# Patient Record
Sex: FEMALE | Race: BLACK OR AFRICAN AMERICAN | Employment: FULL TIME | URBAN - METROPOLITAN AREA
[De-identification: names, ages, dates, MRNs, and addresses within clinical notes are randomized per-mention and may not be internally consistent; named-entity substitution may affect disease eponyms.]

---

## 2021-04-20 ENCOUNTER — APPOINTMENT (OUTPATIENT)
Dept: LAB | Facility: CLINIC | Age: 56
End: 2021-04-20

## 2021-04-26 ENCOUNTER — VIRTUAL VISIT (OUTPATIENT)
Dept: FAMILY MEDICINE | Facility: CLINIC | Age: 56
End: 2021-04-26

## 2021-04-26 VITALS — HEIGHT: 66 IN | WEIGHT: 230 LBS | BODY MASS INDEX: 36.96 KG/M2

## 2021-04-26 DIAGNOSIS — Z11.1 SCREENING EXAMINATION FOR PULMONARY TUBERCULOSIS: Primary | ICD-10-CM

## 2021-04-26 PROCEDURE — 99202 OFFICE O/P NEW SF 15 MIN: CPT | Mod: 95 | Performed by: INTERNAL MEDICINE

## 2021-04-26 ASSESSMENT — MIFFLIN-ST. JEOR: SCORE: 1660.02

## 2021-04-26 NOTE — PATIENT INSTRUCTIONS
Patient Education     Tuberculosis Testing    Tuberculosis (TB) is a bacterial disease that spreads through the air. A person with TB of the throat or lungs who coughs, speaks, or sings can unknowingly spread the bacteria into the air. Uninfected people nearby can breathe in the TB bacteria and be infected.  TB can cause serious health problems. To protect your health, get tested.  Important  If you have active TB disease or simply test positive for TB infection, you must see a healthcare provider for an exam and treatment.   Who should be tested?  Anyone can be exposed to TB. But healthcare providers, homeless people, and people coming from countries with high TB rates are more likely to be exposed to it. Older adults and people with HIV and AIDS are less able to fight off infections. They are also more likely to get TB. If you re at risk for exposure, get tested regularly.  TB tests    Skin test. The TB skin test tells you if the TB bacteria are in your body. A small amount of solution is put under the skin with a needle. This is done to see if a reaction occurs, such as a hard, red bump.    Blood test. A small amount of blood is drawn and sent to a lab for testing.    Other tests. If you are being evaluated for active TB, a sample may be taken of the mucus that comes up when you cough (sputum). Or some other tissue or body fluid sample may be taken. It is then tested for TB.  Getting your TB test results  After the placement of a TB skin test, 2 to 3 days later you ll need to return to the office to get the test read. Be sure to keep this appointment. In some cases, a second test may be done to confirm results. If you have a blood test, the results are often available in a week. Samples of sputum and other body tissues and fluids can take up to 6 weeks for final results.  What do the test results mean    Negative results. These mean you likely don t have the TB bacteria in your body. But in people with some  chronic illnesses, the TB tests can be negative even if there is TB in the body.    Positive results. These mean that you may have been infected with the TB bacteria. This doesn t necessarily mean you have active TB disease. You will need more tests, such as chest X-rays, to find out if you have active TB disease. If you don't have active TB, then you are said to have latent TB infection.   [employer] last reviewed this educational content on 6/1/2019 2000-2021 [employer]. All rights reserved. This information is not intended as a substitute for professional medical care. Always follow your healthcare professional's instructions.

## 2021-04-26 NOTE — PROGRESS NOTES
"Ani is a 55 year old who is being evaluated via a billable telephone visit.      What phone number would you like to be contacted at? [phone]  How would you like to obtain your AVS? Mail a copy    Assessment & Plan     Screening examination for pulmonary tuberculosis  Patient is completely symptomatic however she requests a TB test for an appointment  She had a skin test in the past which was negative  She is completely asymptomatic  No history of any cough or shortness of breath or night sweats  No history of her loss of weight  I gave her the address of [facility] to her  I also provided her with the central scheduling number as well as [facility] number  Advised her to call and make an appointment with the lab  - [facility] TB Gold Plus; Future      15 minutes spent on the date of the encounter doing chart review, history and exam, documentation and further activities per the note       BMI:   Estimated body mass index is 37.12 kg/m  as calculated from the following:    Height as of this encounter: 1.676 m (5' 6\").    Weight as of this encounter: 104.3 kg (230 lb).           Return if symptoms worsen or fail to improve.    Diego Paige MD  [facility] [place]    Rocio Law is a 55 year old who presents for the following health issues     HPI     Pt would like TB for work.        Review of Systems   Constitutional, HEENT, cardiovascular, pulmonary, gi and gu systems are negative, except as otherwise noted.      Objective           Vitals:  No vitals were obtained today due to virtual visit.    Physical Exam   healthy, alert and no distress  PSYCH: Alert and oriented times 3; coherent speech, normal   rate and volume, able to articulate logical thoughts, able   to abstract reason, no tangential thoughts, no hallucinations   or delusions  Her affect is normal  RESP: No cough, no audible wheezing, able to talk in full sentences  Remainder of exam unable to be " completed due to telephone visits                Phone call duration: 10 minutes

## 2021-04-27 ENCOUNTER — TELEPHONE (OUTPATIENT)
Dept: FAMILY MEDICINE | Facility: CLINIC | Age: 56
End: 2021-04-27

## 2021-04-27 DIAGNOSIS — Z11.1 SCREENING EXAMINATION FOR PULMONARY TUBERCULOSIS: ICD-10-CM

## 2021-04-27 NOTE — LETTER
[address] [address]  Phone: [phone]    May 3, 2021        Ani Alves  [id number] [address]    [place] [address]          To whom it may concern:    RE: Ani Alves    Your QuantiFERON-TB test is negative  This was done on 4/30/2021    Please contact me for questions or concerns.      Sincerely,        Diego Paige MD

## 2021-04-27 NOTE — TELEPHONE ENCOUNTER
Pt had virtual visit yesterday, and lab appt today, /April 27, 2021.    Employment screening form placed in Dr. Diego Paige's bin for review      Nirmala AGUIRRE (R))

## 2021-04-30 ENCOUNTER — MEDICAL CORRESPONDENCE (OUTPATIENT)
Dept: HEALTH INFORMATION MANAGEMENT | Facility: CLINIC | Age: 56
End: 2021-04-30

## 2021-04-30 ENCOUNTER — TRANSFERRED RECORDS (OUTPATIENT)
Dept: HEALTH INFORMATION MANAGEMENT | Facility: CLINIC | Age: 56
End: 2021-04-30

## 2021-04-30 DIAGNOSIS — Z11.1 SCREENING EXAMINATION FOR PULMONARY TUBERCULOSIS: ICD-10-CM

## 2021-04-30 PROCEDURE — 36415 COLL VENOUS BLD VENIPUNCTURE: CPT | Performed by: INTERNAL MEDICINE

## 2021-04-30 PROCEDURE — 86481 TB AG RESPONSE T-CELL SUSP: CPT | Performed by: INTERNAL MEDICINE

## 2021-05-02 LAB
GAMMA INTERFERON BACKGROUND BLD IA-ACNC: 0.05 IU/ML
M TB IFN-G CD4+ BCKGRND COR BLD-ACNC: 9.95 IU/ML
M TB TUBERC IFN-G BLD QL: NEGATIVE
MITOGEN IGNF BCKGRD COR BLD-ACNC: 0.03 IU/ML
MITOGEN IGNF BCKGRD COR BLD-ACNC: 0.23 IU/ML

## 2021-11-14 ENCOUNTER — HEALTH MAINTENANCE LETTER (OUTPATIENT)
Age: 56
End: 2021-11-14

## 2022-11-21 ENCOUNTER — HEALTH MAINTENANCE LETTER (OUTPATIENT)
Age: 57
End: 2022-11-21

## 2023-11-26 ENCOUNTER — HEALTH MAINTENANCE LETTER (OUTPATIENT)
Age: 58
End: 2023-11-26